# Patient Record
Sex: FEMALE | Race: WHITE | Employment: FULL TIME | URBAN - METROPOLITAN AREA
[De-identification: names, ages, dates, MRNs, and addresses within clinical notes are randomized per-mention and may not be internally consistent; named-entity substitution may affect disease eponyms.]

---

## 2023-05-15 DIAGNOSIS — M19.90 ARTHRITIS: Primary | ICD-10-CM

## 2023-05-15 RX ORDER — MELOXICAM 15 MG/1
TABLET ORAL
Qty: 90 TABLET | Refills: 3 | Status: SHIPPED | OUTPATIENT
Start: 2023-05-15

## 2023-05-20 ENCOUNTER — APPOINTMENT (OUTPATIENT)
Dept: CT IMAGING | Age: 48
End: 2023-05-20
Payer: COMMERCIAL

## 2023-05-20 ENCOUNTER — HOSPITAL ENCOUNTER (EMERGENCY)
Age: 48
Discharge: HOME | End: 2023-05-20
Payer: COMMERCIAL

## 2023-05-20 VITALS
WEIGHT: 270 LBS | HEART RATE: 71 BPM | OXYGEN SATURATION: 97 % | TEMPERATURE: 98.3 F | SYSTOLIC BLOOD PRESSURE: 147 MMHG | HEIGHT: 67 IN | BODY MASS INDEX: 42.38 KG/M2 | RESPIRATION RATE: 18 BRPM | DIASTOLIC BLOOD PRESSURE: 89 MMHG

## 2023-05-20 DIAGNOSIS — G43.809 OTHER MIGRAINE WITHOUT STATUS MIGRAINOSUS, NOT INTRACTABLE: Primary | ICD-10-CM

## 2023-05-20 PROCEDURE — 96361 HYDRATE IV INFUSION ADD-ON: CPT

## 2023-05-20 PROCEDURE — 99284 EMERGENCY DEPT VISIT MOD MDM: CPT

## 2023-05-20 PROCEDURE — 6360000002 HC RX W HCPCS

## 2023-05-20 PROCEDURE — 96374 THER/PROPH/DIAG INJ IV PUSH: CPT

## 2023-05-20 PROCEDURE — 96375 TX/PRO/DX INJ NEW DRUG ADDON: CPT

## 2023-05-20 PROCEDURE — 2580000003 HC RX 258

## 2023-05-20 PROCEDURE — 6370000000 HC RX 637 (ALT 250 FOR IP)

## 2023-05-20 PROCEDURE — 70450 CT HEAD/BRAIN W/O DYE: CPT

## 2023-05-20 RX ORDER — 0.9 % SODIUM CHLORIDE 0.9 %
1000 INTRAVENOUS SOLUTION INTRAVENOUS ONCE
Status: COMPLETED | OUTPATIENT
Start: 2023-05-20 | End: 2023-05-20

## 2023-05-20 RX ORDER — METOCLOPRAMIDE HYDROCHLORIDE 5 MG/ML
10 INJECTION INTRAMUSCULAR; INTRAVENOUS ONCE
Status: COMPLETED | OUTPATIENT
Start: 2023-05-20 | End: 2023-05-20

## 2023-05-20 RX ORDER — METOCLOPRAMIDE 10 MG/1
10 TABLET ORAL 4 TIMES DAILY
Qty: 120 TABLET | Refills: 0 | Status: SHIPPED | OUTPATIENT
Start: 2023-05-20

## 2023-05-20 RX ORDER — DIPHENHYDRAMINE HYDROCHLORIDE 50 MG/ML
50 INJECTION INTRAMUSCULAR; INTRAVENOUS ONCE
Status: COMPLETED | OUTPATIENT
Start: 2023-05-20 | End: 2023-05-20

## 2023-05-20 RX ORDER — ACETAMINOPHEN 500 MG
1000 TABLET ORAL ONCE
Status: COMPLETED | OUTPATIENT
Start: 2023-05-20 | End: 2023-05-20

## 2023-05-20 RX ORDER — LEVOTHYROXINE SODIUM 0.2 MG/1
200 TABLET ORAL
COMMUNITY
Start: 2022-07-15

## 2023-05-20 RX ADMIN — ACETAMINOPHEN 1000 MG: 500 TABLET ORAL at 14:13

## 2023-05-20 RX ADMIN — DIPHENHYDRAMINE HYDROCHLORIDE 50 MG: 50 INJECTION, SOLUTION INTRAMUSCULAR; INTRAVENOUS at 14:15

## 2023-05-20 RX ADMIN — SODIUM CHLORIDE 1000 ML: 9 INJECTION, SOLUTION INTRAVENOUS at 14:13

## 2023-05-20 RX ADMIN — METOCLOPRAMIDE HYDROCHLORIDE 10 MG: 5 INJECTION INTRAMUSCULAR; INTRAVENOUS at 14:16

## 2023-05-20 ASSESSMENT — PAIN SCALES - GENERAL
PAINLEVEL_OUTOF10: 8
PAINLEVEL_OUTOF10: 0
PAINLEVEL_OUTOF10: 7

## 2023-05-20 ASSESSMENT — ENCOUNTER SYMPTOMS
RHINORRHEA: 0
NAUSEA: 0
DIARRHEA: 0
TROUBLE SWALLOWING: 0
BACK PAIN: 0
SHORTNESS OF BREATH: 0
VOMITING: 0
VOICE CHANGE: 0
SORE THROAT: 0
ABDOMINAL DISTENTION: 0
COUGH: 0
ABDOMINAL PAIN: 0
EYES NEGATIVE: 1
CHOKING: 0

## 2023-05-20 ASSESSMENT — PAIN DESCRIPTION - LOCATION
LOCATION: HEAD
LOCATION: HEAD;GENERALIZED

## 2023-05-20 ASSESSMENT — PAIN DESCRIPTION - FREQUENCY: FREQUENCY: INTERMITTENT

## 2023-05-20 ASSESSMENT — PAIN DESCRIPTION - DESCRIPTORS: DESCRIPTORS: ACHING

## 2023-05-20 ASSESSMENT — PAIN - FUNCTIONAL ASSESSMENT: PAIN_FUNCTIONAL_ASSESSMENT: 0-10

## 2023-05-20 NOTE — ED PROVIDER NOTES
[address] [facility] ED  eMERGENCYdEPARTMENT eNCOUnter      Pt Name: Shantal Baldwin  MRN: [id number]  [place] 1975  Date of evaluation: 5/20/2023  Denver Hoard, PA-C    CHIEF COMPLAINT           HPI  Shantal Baldwin is a 50 y.o. female per chart review has a h/o migraine headaches, hypothyroidism presents to the ED with complaints of abrupt onset of constant, moderate, aching all over her migraine HA x1 day. Began this AM.  Patient states it is much more painful than normal. \"20x worse\". + Nausea, dry heaving, photophobia. She denies any blurry vision, neck pain, back pain. No injury or trauma. No recent illness. Is not on chronic migraine medication. Using Tylenol at home without relief. She is able to ambulate, alert, oriented, answering all questions appropriately. Denies SOB, CP.    ROS  Review of Systems   Constitutional:  Negative for chills, fatigue and fever. HENT:  Negative for congestion, rhinorrhea, sneezing, sore throat, trouble swallowing and voice change. Eyes: Negative. Respiratory:  Negative for cough, choking and shortness of breath. Cardiovascular:  Negative for chest pain. Gastrointestinal:  Negative for abdominal distention, abdominal pain, diarrhea, nausea and vomiting. Endocrine: Negative. Genitourinary:  Negative for dysuria, flank pain, hematuria and urgency. Musculoskeletal: Negative. Negative for arthralgias, back pain, joint swelling, myalgias and neck pain. Skin: Negative. Neurological:  Positive for headaches. Negative for dizziness, tremors, seizures, syncope, facial asymmetry, speech difficulty and weakness. Except as noted above the remainder of the review of systems was reviewed and negative.        PAST MEDICAL HISTORY     Past Medical History:   Diagnosis Date    Hypothyroidism          SURGICAL HISTORY       Past Surgical History:   Procedure Laterality Date    TONSILLECTOMY           CURRENTMEDICATIONS       Previous

## 2024-06-11 ENCOUNTER — OFFICE VISIT (OUTPATIENT)
Dept: OBSTETRICS AND GYNECOLOGY | Facility: CLINIC | Age: 49
End: 2024-06-11
Payer: COMMERCIAL

## 2024-06-11 VITALS
SYSTOLIC BLOOD PRESSURE: 120 MMHG | DIASTOLIC BLOOD PRESSURE: 70 MMHG | BODY MASS INDEX: 48.82 KG/M2 | WEIGHT: 258.6 LBS | HEIGHT: 61 IN

## 2024-06-11 DIAGNOSIS — N84.0 ENDOMETRIAL POLYP: ICD-10-CM

## 2024-06-11 DIAGNOSIS — N93.9 ABNORMAL UTERINE BLEEDING (AUB): ICD-10-CM

## 2024-06-11 DIAGNOSIS — R10.2 PELVIC PAIN IN FEMALE: Primary | ICD-10-CM

## 2024-06-11 PROBLEM — M19.90 OSTEOARTHRITIS: Status: ACTIVE | Noted: 2024-06-11

## 2024-06-11 PROBLEM — F32.A DEPRESSION: Status: ACTIVE | Noted: 2024-06-11

## 2024-06-11 PROBLEM — E28.2 PCOS (POLYCYSTIC OVARIAN SYNDROME): Status: ACTIVE | Noted: 2023-07-25

## 2024-06-11 PROBLEM — G43.909 MIGRAINES: Status: ACTIVE | Noted: 2024-06-11

## 2024-06-11 PROBLEM — M13.0 POLYARTHRITIS: Status: ACTIVE | Noted: 2024-06-11

## 2024-06-11 PROBLEM — E03.9 HYPOTHYROIDISM: Status: ACTIVE | Noted: 2024-06-11

## 2024-06-11 PROBLEM — E55.9 VITAMIN D DEFICIENCY: Status: ACTIVE | Noted: 2023-07-25

## 2024-06-11 PROBLEM — K21.9 GERD (GASTROESOPHAGEAL REFLUX DISEASE): Status: ACTIVE | Noted: 2024-01-31

## 2024-06-11 LAB — PREGNANCY TEST URINE, POC: NEGATIVE

## 2024-06-11 PROCEDURE — 58558 HYSTEROSCOPY BIOPSY: CPT | Performed by: OBSTETRICS & GYNECOLOGY

## 2024-06-11 PROCEDURE — 1036F TOBACCO NON-USER: CPT | Performed by: OBSTETRICS & GYNECOLOGY

## 2024-06-11 PROCEDURE — 88305 TISSUE EXAM BY PATHOLOGIST: CPT

## 2024-06-11 PROCEDURE — 81025 URINE PREGNANCY TEST: CPT | Performed by: OBSTETRICS & GYNECOLOGY

## 2024-06-11 RX ORDER — LEVOTHYROXINE SODIUM 175 UG/1
1 TABLET ORAL
COMMUNITY
Start: 2023-07-31 | End: 2024-07-30

## 2024-06-11 RX ORDER — TRANEXAMIC ACID 650 MG/1
1300 TABLET ORAL 3 TIMES DAILY
Qty: 30 TABLET | Refills: 1 | Status: SHIPPED | OUTPATIENT
Start: 2024-06-11 | End: 2024-06-16

## 2024-06-11 NOTE — PROGRESS NOTES
Patient ID: Rick Banks is a 49 y.o. female for office hysteroscopy with polypectomy for endometrial polyp found on ultrasound for workup for pelvic pain as ordered by PCP.  Patient reports that she had a Pap smear done at another GYN last year that was normal she believes.  She has chronic heavy periods since she was a teenager, these have gotten worse with patches of clots and significant cramping more on the right side.  Patient reports that she had a Pap smear done at another GYN last year that was normal she believes.  Endometrial hyperplasia risk factors include obesity and PCOS.      Procedures    HYSTEROSCOPY WITH EMB/POLYPECTOMY OFFICE PROCEDURE NOTE    Patient's pre-procedure questions were answered and a written informed consent form was signed.   Patient was placed in lithotomy position on the procedure room table.  A speculum was placed in the vagina. The cervix was cleansed × 3 with Betadine.  A single-tooth tenaculum was placed on the anterior lip of the cervix.  A paracervical block of 10 mL of 0.25% marcaine was placed.   The Aveta Opal hysteroscope was then inserted into the endometrial cavity, normal saline was used for the hysteroscopic fluid medium.  The cavity distended well with evidence of large fundal right endometrial polyp along with other smaller polypoid looking lesions.  The background endometrium appeared very proliferative/thickened.  The resectoscope was placed through the hysteroscope and the polyp were then removed, as was background endometrial tissue. Procedure was then ended, the instruments removed from the uterus and vagina. The tenaculum site was hemostatic. The patient tolerated the procedure well.  Blood loss was minimal.      Problem List Items Addressed This Visit    None  Visit Diagnoses       Pelvic pain in female    -  Primary    Relevant Orders    Surgical Pathology Exam    Endometrial polyp        Relevant Orders    POCT pregnancy, urine manually resulted     Surgical Pathology Exam    Abnormal uterine bleeding (AUB)        Relevant Medications    tranexamic acid (Lysteda) 650 mg tablet tablet          AUB:  AUB-P.  Office hysteroscopy with polypectomy done today without complication, postprocedure instructions reviewed.  Rx for Lysteda elected by the patient, use/AE reviewed.  Additional treatment options of progesterone IUD, endometrial ablation, or definitive hysterectomy discussed.    Last pap/HPV wnl in 2023 per patient, prior was wnl in 2021.  Last testing before that was > 15 years prior.  Records release signed to get patient's last pap/HPV test results.  If these results were normal then she can go 5 years (2028) until next screening.  ASCCP guidelines reviewed with patient.      F/U in 2 to 3 weeks for virtual visit to discuss PATH results and to recheck AUB symptoms.      Bernardo Araujo, DO

## 2024-06-18 LAB
LABORATORY COMMENT REPORT: NORMAL
PATH REPORT.FINAL DX SPEC: NORMAL
PATH REPORT.GROSS SPEC: NORMAL
PATH REPORT.RELEVANT HX SPEC: NORMAL
PATH REPORT.TOTAL CANCER: NORMAL

## 2024-07-03 ENCOUNTER — APPOINTMENT (OUTPATIENT)
Dept: OBSTETRICS AND GYNECOLOGY | Facility: CLINIC | Age: 49
End: 2024-07-03
Payer: COMMERCIAL

## 2024-07-03 DIAGNOSIS — N84.0 ENDOMETRIAL POLYP: ICD-10-CM

## 2024-07-03 DIAGNOSIS — Z12.31 SCREENING MAMMOGRAM FOR BREAST CANCER: ICD-10-CM

## 2024-07-03 DIAGNOSIS — N93.9 ABNORMAL UTERINE BLEEDING (AUB): Primary | ICD-10-CM

## 2024-07-03 PROBLEM — F32.A DEPRESSION: Status: RESOLVED | Noted: 2024-06-11 | Resolved: 2024-07-03

## 2024-07-03 PROCEDURE — 99214 OFFICE O/P EST MOD 30 MIN: CPT | Performed by: OBSTETRICS & GYNECOLOGY

## 2024-07-03 RX ORDER — TRANEXAMIC ACID 650 MG/1
1300 TABLET ORAL 3 TIMES DAILY
Qty: 30 TABLET | Refills: 11 | Status: SHIPPED | OUTPATIENT
Start: 2024-07-03 | End: 2024-07-08

## 2024-07-03 NOTE — PROGRESS NOTES
GYNECOLOGY VIRTUAL VISIT PROGRESS NOTE          CC:     Chief Complaint   Patient presents with    Follow-up     EMB results        HPI:  Rick Banks is scheduled today for virtual visit to discuss test results.   Audio and Visual communication real-time were utilized and verbal consent was obtained for the encounter.    She did okay initially after the procedure but few days later started what she believed was a.  It was very heavy with passage of clots and tissue and cramping.  After that she had another episode of bleeding that lasted shorter period of time.  She did not take the Lysteda because before she could take it the bleeding stopped.      ROS:  GYN - see HPI      PHYSICAL EXAM:  VS:  n/a (virtual visit)  GEN:  A&O, NAD  PSYCH:  normal affect, non-anxious      IMPRESSION/PLAN:    Problem List Items Addressed This Visit       Abnormal uterine bleeding (AUB) - Primary    Relevant Medications    tranexamic acid (Lysteda) 650 mg tablet tablet     Other Visit Diagnoses       Endometrial polyp              AUB:  S/P hysteroscopic polypectomy.  Path results benign--reviewed with patient (polyp not identified by pathologist, but clinically was a polyp at office H/S).  At this point time offered expectant management to see if periods normalize after polypectomy versus additional treatment.  Treatment options of progesterone-only BCPs, progesterone IUD, Rx Lysteda, endometrial ablation (office +/- self-pay nitrous oxide or in OR), or definitive hysterectomy discussed--patient opts to try her home Lysteda Rx to see how her next few periods do and to see if additional treatment is needed in the future.  Patient declines additional treatments discussed for now.  AUB precautions reviewed.  Rx for Lysteda provided for 1 year, use/AE reviewed, no contraindications to use, VTE precautions reviewed.    Preventative care:   -Pap/HPV wnl in 2021, no Hx of HGSIL, next due in 5 years (2026).  ASCCP pap smear screening  guidelines reviewed with the patient.    Breast CA screening:    -Mammogram in 2021 normal--overdue.  Breast density on last mammogram is heterogeneous.  Due to decreased sensitivity of mammogram screening with dense breasts a yearly LANEY is recommended for screening.   Alternate imaging discussed--and not elected at this time.    -ColoGuard normal in 2022, repeat due in 3 years (2025).        F/U PRN or when next preventative GYN examination due.       Bernardo Araujo, DO

## 2024-07-05 ENCOUNTER — HOSPITAL ENCOUNTER (OUTPATIENT)
Dept: RADIOLOGY | Facility: HOSPITAL | Age: 49
Discharge: HOME | End: 2024-07-05
Payer: COMMERCIAL

## 2024-07-05 VITALS — HEIGHT: 66 IN | WEIGHT: 258 LBS | BODY MASS INDEX: 41.46 KG/M2

## 2024-07-05 DIAGNOSIS — Z12.31 SCREENING MAMMOGRAM FOR BREAST CANCER: ICD-10-CM

## 2024-07-05 PROCEDURE — 77067 SCR MAMMO BI INCL CAD: CPT

## 2024-07-12 ENCOUNTER — TELEPHONE (OUTPATIENT)
Dept: OBSTETRICS AND GYNECOLOGY | Facility: CLINIC | Age: 49
End: 2024-07-12
Payer: COMMERCIAL

## 2024-07-12 DIAGNOSIS — Z12.39 BREAST CANCER SCREENING, HIGH RISK PATIENT: ICD-10-CM

## 2024-07-12 PROBLEM — Z91.89 INCREASED RISK OF BREAST CANCER: Status: ACTIVE | Noted: 2024-07-12

## 2024-07-12 NOTE — TELEPHONE ENCOUNTER
"----- Message from Bernardo Araujo sent at 7/12/2024  8:52 AM EDT -----  Call patient please:   The risk is over >20% lifetime of breast cancer which is what the radiologist had calculated documented on his report (although he did not put an actual number).  I like to just double check this calculator.  Anything over 20% is considered high risk.      I would like to send her to a breast specialist for surveillance/exam--given her family history, she may be offered medication to lower her breast CA risk and they'll likely offer her advanced breast imaging other than MRI.  And I would also like her to see a genetic counselor to consider BRCA testing if she has not been tested previously.      If okay with this order referral to Dr. Mendez and genetic counselor for diagnoses of \"increased risk for breast cancer\" and \"family history of breast cancer\"  "

## 2024-07-12 NOTE — TELEPHONE ENCOUNTER
Spoke with patient and made her aware of the results and recommendation.  Referral placed and sent to Dr. Araujo for signature.  Reviewed and approved by LLOYD CARCAMO on 7/12/24 at 11:18 AM.

## 2025-07-07 ENCOUNTER — HOSPITAL ENCOUNTER (OUTPATIENT)
Dept: RADIOLOGY | Facility: HOSPITAL | Age: 50
Discharge: HOME | End: 2025-07-07
Payer: COMMERCIAL

## 2025-07-07 VITALS — WEIGHT: 245 LBS | BODY MASS INDEX: 39.37 KG/M2

## 2025-07-07 DIAGNOSIS — Z12.31 SCREENING MAMMOGRAM FOR BREAST CANCER: ICD-10-CM

## 2025-07-07 PROCEDURE — 77063 BREAST TOMOSYNTHESIS BI: CPT | Performed by: STUDENT IN AN ORGANIZED HEALTH CARE EDUCATION/TRAINING PROGRAM

## 2025-07-07 PROCEDURE — 77067 SCR MAMMO BI INCL CAD: CPT

## 2025-07-07 PROCEDURE — 77067 SCR MAMMO BI INCL CAD: CPT | Performed by: STUDENT IN AN ORGANIZED HEALTH CARE EDUCATION/TRAINING PROGRAM

## 2025-07-09 ENCOUNTER — TELEPHONE (OUTPATIENT)
Dept: OBSTETRICS AND GYNECOLOGY | Facility: CLINIC | Age: 50
End: 2025-07-09
Payer: COMMERCIAL

## 2025-07-09 NOTE — TELEPHONE ENCOUNTER
"----- Message from Bernardo Araujo sent at 7/8/2025  1:30 PM EDT -----  Call patient please:   normal mammogram.  The radiologist calculated a Tyrer-Briana score that shows she has an elevated lifetime risk of breast CA even with a normal mammogram.  If she would like   additional evaluation/screening/consideration of medications to reduce risk then I would recommend that we refer to the  high risk breast clinic    \"Based on the Tyrer-Cuzick model for breast cancer risk assessment,  this patient is at an elevated risk of developing breast cancer and  may benefit from additional screening with breast MRI or ultrasound.  Please note that this estimate is based on responses provided on the  patient questionnaire. For more information regarding high risk  consultation, please call [phone].\"  ----- Message -----  From: Yasmin, Radiology Results In  Sent: 7/8/2025  12:37 PM EDT  To: Bernardo Araujo, DO    "

## 2025-08-15 DIAGNOSIS — N93.9 ABNORMAL UTERINE BLEEDING (AUB): Primary | ICD-10-CM

## 2025-08-18 RX ORDER — TRANEXAMIC ACID 650 MG/1
TABLET ORAL
Qty: 30 TABLET | Refills: 0 | Status: SHIPPED | OUTPATIENT
Start: 2025-08-18